# Patient Record
Sex: FEMALE
[De-identification: names, ages, dates, MRNs, and addresses within clinical notes are randomized per-mention and may not be internally consistent; named-entity substitution may affect disease eponyms.]

---

## 2021-05-16 ENCOUNTER — TRANSCRIPTION ENCOUNTER (OUTPATIENT)
Age: 46
End: 2021-05-16

## 2021-06-04 ENCOUNTER — TRANSCRIPTION ENCOUNTER (OUTPATIENT)
Age: 46
End: 2021-06-04

## 2022-04-03 ENCOUNTER — TRANSCRIPTION ENCOUNTER (OUTPATIENT)
Age: 47
End: 2022-04-03

## 2022-09-27 ENCOUNTER — NON-APPOINTMENT (OUTPATIENT)
Age: 47
End: 2022-09-27

## 2023-02-06 ENCOUNTER — LABORATORY RESULT (OUTPATIENT)
Age: 48
End: 2023-02-06

## 2023-02-06 ENCOUNTER — APPOINTMENT (OUTPATIENT)
Dept: OBGYN | Facility: CLINIC | Age: 48
End: 2023-02-06
Payer: COMMERCIAL

## 2023-02-06 VITALS
HEIGHT: 63 IN | WEIGHT: 125 LBS | SYSTOLIC BLOOD PRESSURE: 121 MMHG | DIASTOLIC BLOOD PRESSURE: 76 MMHG | BODY MASS INDEX: 22.15 KG/M2

## 2023-02-06 DIAGNOSIS — Z00.00 ENCOUNTER FOR GENERAL ADULT MEDICAL EXAMINATION W/OUT ABNORMAL FINDINGS: ICD-10-CM

## 2023-02-06 DIAGNOSIS — Z78.9 OTHER SPECIFIED HEALTH STATUS: ICD-10-CM

## 2023-02-06 DIAGNOSIS — Z87.891 PERSONAL HISTORY OF NICOTINE DEPENDENCE: ICD-10-CM

## 2023-02-06 DIAGNOSIS — B00.1 HERPESVIRAL VESICULAR DERMATITIS: ICD-10-CM

## 2023-02-06 DIAGNOSIS — Z87.42 PERSONAL HISTORY OF OTHER DISEASES OF THE FEMALE GENITAL TRACT: ICD-10-CM

## 2023-02-06 DIAGNOSIS — Z80.1 FAMILY HISTORY OF MALIGNANT NEOPLASM OF TRACHEA, BRONCHUS AND LUNG: ICD-10-CM

## 2023-02-06 DIAGNOSIS — R87.610 ATYPICAL SQUAMOUS CELLS OF UNDETERMINED SIGNIFICANCE ON CYTOLOGIC SMEAR OF CERVIX (ASC-US): ICD-10-CM

## 2023-02-06 DIAGNOSIS — Z80.3 FAMILY HISTORY OF MALIGNANT NEOPLASM OF BREAST: ICD-10-CM

## 2023-02-06 PROCEDURE — 99386 PREV VISIT NEW AGE 40-64: CPT

## 2023-02-06 PROCEDURE — 82270 OCCULT BLOOD FECES: CPT

## 2023-02-06 RX ORDER — ACYCLOVIR 800 MG/1
800 TABLET ORAL
Qty: 35 | Refills: 0 | Status: ACTIVE | COMMUNITY
Start: 2022-09-28

## 2023-02-06 NOTE — HISTORY OF PRESENT ILLNESS
[Patient reported mammogram was normal] : Patient reported mammogram was normal [Patient reported PAP Smear was abnormal] : Patient reported PAP Smear was abnormal [FreeTextEntry1] : 46yo  LMP 2 wks ago using her husbands vasectomy for BCM is here for GYN intake.  Pt has intermittent heavy periods. Clotty, every other month. Never had any eval or TVUS.  [Mammogramdate] : 2021 [PapSmeardate] : 2022 [TextBox_31] : s/p colpo with neg results  [ColonoscopyDate] : NEVER

## 2023-02-06 NOTE — DISCUSSION/SUMMARY
[FreeTextEntry1] : 46yo  LMP 2 wks ago using her husbands vasectomy for BCM with h/o abnl paps and intermittently heavy bleeding. \par \par abnl paps:\par - pap and HPV sent today \par - Med rec release form completed today\par \par Intermittently heavy menses: \par - TVUS and MD visit \par - Will need endo bx \par \par RHM: \par - DVS neg x 3\par - Dentist, PCP, Derm as discussed \par - Rx given for mammo/sono\par - Colonoscopy as discussed -- pt due ASAP. Discussed importance of following up for colon ca screening. She verbalized understanding. \par - Offered STI screen today. Pt declined\par - Encouraged healthy diet, exercise, weight maint. \par \par Nalini Patel MD \par Obstetrician/Gynecologist\par

## 2023-02-06 NOTE — COUNSELING
[Nutrition/ Exercise/ Weight Management] : nutrition, exercise, weight management [Contraception/ Emergency Contraception/ Safe Sexual Practices] : contraception, emergency contraception, safe sexual practices [Intimate Partner Violence] : intimate partner violence [STD (testing, results, tx)] : STD (testing, results, tx)

## 2023-02-10 ENCOUNTER — APPOINTMENT (OUTPATIENT)
Dept: ANTEPARTUM | Facility: CLINIC | Age: 48
End: 2023-02-10

## 2023-02-13 ENCOUNTER — APPOINTMENT (OUTPATIENT)
Dept: ANTEPARTUM | Facility: CLINIC | Age: 48
End: 2023-02-13

## 2023-02-17 ENCOUNTER — NON-APPOINTMENT (OUTPATIENT)
Age: 48
End: 2023-02-17

## 2023-02-20 LAB — CYTOLOGY CVX/VAG DOC THIN PREP: NORMAL

## 2023-03-02 ENCOUNTER — APPOINTMENT (OUTPATIENT)
Dept: OBGYN | Facility: CLINIC | Age: 48
End: 2023-03-02

## 2023-03-02 ENCOUNTER — OFFICE (OUTPATIENT)
Dept: URBAN - METROPOLITAN AREA CLINIC 8 | Facility: CLINIC | Age: 48
Setting detail: OPHTHALMOLOGY
End: 2023-03-02
Payer: COMMERCIAL

## 2023-03-02 DIAGNOSIS — H52.4: ICD-10-CM

## 2023-03-02 DIAGNOSIS — H16.223: ICD-10-CM

## 2023-03-02 DIAGNOSIS — H43.393: ICD-10-CM

## 2023-03-02 PROBLEM — H52.7 REFRACTIVE ERROR: Status: ACTIVE | Noted: 2023-03-02

## 2023-03-02 PROCEDURE — 92014 COMPRE OPH EXAM EST PT 1/>: CPT | Performed by: OPHTHALMOLOGY

## 2023-03-02 PROCEDURE — 92015 DETERMINE REFRACTIVE STATE: CPT | Performed by: OPHTHALMOLOGY

## 2023-03-02 ASSESSMENT — REFRACTION_CURRENTRX
OS_AXIS: 092
OS_ADD: +1.50
OS_SPHERE: -2.00
OS_CYLINDER: -0.75
OS_OVR_VA: 20/
OS_VPRISM_DIRECTION: PROGS
OD_ADD: +1.50
OD_VPRISM_DIRECTION: PROGS
OD_CYLINDER: -0.50
OD_OVR_VA: 20/
OD_AXIS: 021
OD_SPHERE: -2.25

## 2023-03-02 ASSESSMENT — REFRACTION_MANIFEST
OD_VA1: 20/20
OD_ADD: +1.50
OS_SPHERE: -2.00
OS_CYLINDER: -0.75
OD_AXIS: 010
OU_VA: 20/20
OD_SPHERE: -2.25
OD_SPHERE: -2.25
OS_AXIS: 110
OD_VA2: 20/25(J1)
OD_VA2: 20/25(J1)
OS_ADD: +1.25
OS_VA2: 20/25(J1)
OD_CYLINDER: -0.25
OD_AXIS: 010
OU_VA: 20/20
OS_VA2: 20/25(J1)
OS_VA1: 20/20
OD_VA1: 20/20
OD_CYLINDER: -0.25
OS_ADD: +1.50
OD_ADD: +1.25
OS_SPHERE: -2.00
OS_CYLINDER: -0.75
OS_AXIS: 110
OS_VA1: 20/20

## 2023-03-02 ASSESSMENT — SUPERFICIAL PUNCTATE KERATITIS (SPK)
OD_SPK: T
OS_SPK: T

## 2023-03-02 ASSESSMENT — REFRACTION_AUTOREFRACTION
OD_AXIS: 010
OD_CYLINDER: -0.50
OS_CYLINDER: -0.50
OS_AXIS: 110
OD_SPHERE: -2.25
OS_SPHERE: -2.00

## 2023-03-02 ASSESSMENT — SPHEQUIV_DERIVED
OS_SPHEQUIV: -2.375
OD_SPHEQUIV: -2.375
OS_SPHEQUIV: -2.375
OD_SPHEQUIV: -2.375
OS_SPHEQUIV: -2.25
OD_SPHEQUIV: -2.5

## 2023-03-02 ASSESSMENT — KERATOMETRY
OD_K1POWER_DIOPTERS: 46.00
OS_K1POWER_DIOPTERS: 46.75
OD_K2POWER_DIOPTERS: 46.75
OD_AXISANGLE_DEGREES: 097
OS_AXISANGLE_DEGREES: 052
OS_K2POWER_DIOPTERS: 47.00

## 2023-03-02 ASSESSMENT — VISUAL ACUITY
OS_BCVA: 20/20-
OD_BCVA: 20/20-2

## 2023-03-02 ASSESSMENT — AXIALLENGTH_DERIVED
OD_AL: 23.4619
OS_AL: 23.2817
OD_AL: 23.5102
OS_AL: 23.2817
OD_AL: 23.4619
OS_AL: 23.2344

## 2023-03-02 ASSESSMENT — CONFRONTATIONAL VISUAL FIELD TEST (CVF)
OD_FINDINGS: FULL
OS_FINDINGS: FULL

## 2023-03-06 LAB — HPV HIGH+LOW RISK DNA PNL CVX: DETECTED

## 2023-05-01 ENCOUNTER — APPOINTMENT (OUTPATIENT)
Dept: ANTEPARTUM | Facility: CLINIC | Age: 48
End: 2023-05-01
Payer: COMMERCIAL

## 2023-05-01 ENCOUNTER — ASOB RESULT (OUTPATIENT)
Age: 48
End: 2023-05-01

## 2023-05-01 PROCEDURE — 76856 US EXAM PELVIC COMPLETE: CPT | Mod: 59

## 2023-05-01 PROCEDURE — 76830 TRANSVAGINAL US NON-OB: CPT

## 2023-05-08 ENCOUNTER — APPOINTMENT (OUTPATIENT)
Dept: OBGYN | Facility: CLINIC | Age: 48
End: 2023-05-08
Payer: COMMERCIAL

## 2023-05-08 VITALS
BODY MASS INDEX: 22.15 KG/M2 | SYSTOLIC BLOOD PRESSURE: 112 MMHG | DIASTOLIC BLOOD PRESSURE: 72 MMHG | HEIGHT: 63 IN | WEIGHT: 125 LBS

## 2023-05-08 PROCEDURE — 99212 OFFICE O/P EST SF 10 MIN: CPT

## 2023-05-10 NOTE — HISTORY OF PRESENT ILLNESS
[FreeTextEntry1] : 48yo  LMP  is here for results review. We have not received her medical records to review her previous HPV results. \par \par Her TVUS reveals a 6mm EMS with normal R ov and L hemorrhagic cyst.

## 2023-05-10 NOTE — DISCUSSION/SUMMARY
[FreeTextEntry1] : 48yo  LMP 5/8 with intermittently heavy menses and L hemorrhagic cyst \par \par Intermittently heavy menses: \par - EndoSee procedure \par - Pt aware she cannot have anything in vagina or be submerged in water for 2 wks after procedure \par \par L hemo cyst: \par - TVUS and MD visit in 6-8 weeks \par - Torsion and rupture precautions reviewed \par \par Nalini Patel MD \par Obstetrician/Gynecologist\par

## 2023-05-23 ENCOUNTER — APPOINTMENT (OUTPATIENT)
Dept: OBGYN | Facility: CLINIC | Age: 48
End: 2023-05-23
Payer: COMMERCIAL

## 2023-05-23 VITALS
DIASTOLIC BLOOD PRESSURE: 68 MMHG | WEIGHT: 125 LBS | BODY MASS INDEX: 22.15 KG/M2 | SYSTOLIC BLOOD PRESSURE: 111 MMHG | HEIGHT: 63 IN

## 2023-05-23 PROCEDURE — 58558Z: CUSTOM

## 2023-05-23 PROCEDURE — 81025 URINE PREGNANCY TEST: CPT

## 2023-05-23 RX ORDER — MISOPROSTOL 200 UG/1
200 TABLET ORAL
Qty: 4 | Refills: 0 | Status: ACTIVE | COMMUNITY
Start: 2023-05-23 | End: 1900-01-01

## 2023-05-23 NOTE — PROCEDURE
[Hysteroscopy] : Hysteroscopy [Time out performed] : Pre-procedure time out performed.  Patient's name, date of birth and procedure confirmed. [Consent Obtained] : Consent obtained [Risks] : risks [Benefits] : benefits [Alternatives] : alternatives [Patient] : patient [Infection] : infection [Bleeding] : bleeding [Allergic Reaction] : allergic reaction [rigid] : Using aseptic technique a hysteroscopy was performed using a rigid hysteroscope [Hemostasis obtained] : hemostasis obtained [Tolerated Well] : Patient tolerated the procedure well [de-identified] : Intermittently heavy menses  [de-identified] : Unable to pass into cervix. Os finder passed easily but scope did not. Dilation with metal dilators. Unable to even pass pipette. attempted however unsucessful. Procedure abadondoned. Will reattempt with cytotec

## 2023-05-23 NOTE — DISCUSSION/SUMMARY
[FreeTextEntry1] : RTO on 6/13 for repeat attempt \par cytotec Rx'd to be placed night prior to procedure\par Precautions reviewed \par \par Nalini Patel MD \par Obstetrician/Gynecologist\par

## 2023-05-24 LAB
HCG UR QL: NEGATIVE
QUALITY CONTROL: YES

## 2023-06-13 ENCOUNTER — APPOINTMENT (OUTPATIENT)
Dept: ANTEPARTUM | Facility: CLINIC | Age: 48
End: 2023-06-13

## 2023-06-14 ENCOUNTER — NON-APPOINTMENT (OUTPATIENT)
Age: 48
End: 2023-06-14

## 2024-12-25 PROBLEM — F10.90 ALCOHOL USE: Status: ACTIVE | Noted: 2023-02-06

## 2024-12-31 ENCOUNTER — NON-APPOINTMENT (OUTPATIENT)
Age: 49
End: 2024-12-31

## 2025-02-10 ENCOUNTER — LABORATORY RESULT (OUTPATIENT)
Age: 50
End: 2025-02-10

## 2025-02-10 PROBLEM — Z01.419 ENCOUNTER FOR WELL WOMAN EXAM WITH ROUTINE GYNECOLOGICAL EXAM: Status: ACTIVE | Noted: 2025-02-10

## 2025-02-11 ENCOUNTER — APPOINTMENT (OUTPATIENT)
Dept: OBGYN | Facility: CLINIC | Age: 50
End: 2025-02-11
Payer: COMMERCIAL

## 2025-02-11 VITALS
DIASTOLIC BLOOD PRESSURE: 58 MMHG | WEIGHT: 125 LBS | HEIGHT: 63 IN | SYSTOLIC BLOOD PRESSURE: 113 MMHG | BODY MASS INDEX: 22.15 KG/M2

## 2025-02-11 DIAGNOSIS — Z01.419 ENCOUNTER FOR GYNECOLOGICAL EXAMINATION (GENERAL) (ROUTINE) W/OUT ABNORMAL FINDINGS: ICD-10-CM

## 2025-02-11 PROCEDURE — 99396 PREV VISIT EST AGE 40-64: CPT

## 2025-02-12 LAB — HPV HIGH+LOW RISK DNA PNL CVX: DETECTED

## 2025-02-19 ENCOUNTER — NON-APPOINTMENT (OUTPATIENT)
Age: 50
End: 2025-02-19

## 2025-02-19 LAB — CYTOLOGY CVX/VAG DOC THIN PREP: ABNORMAL

## 2025-03-03 ENCOUNTER — APPOINTMENT (OUTPATIENT)
Dept: OBGYN | Facility: CLINIC | Age: 50
End: 2025-03-03

## 2025-04-04 ENCOUNTER — APPOINTMENT (OUTPATIENT)
Dept: OBGYN | Facility: CLINIC | Age: 50
End: 2025-04-04
Payer: COMMERCIAL

## 2025-04-04 DIAGNOSIS — R87.810 CERVICAL HIGH RISK HUMAN PAPILLOMAVIRUS (HPV) DNA TEST POSITIVE: ICD-10-CM

## 2025-04-04 PROCEDURE — 57454 BX/CURETT OF CERVIX W/SCOPE: CPT

## 2025-04-09 LAB — CORE LAB BIOPSY: NORMAL
